# Patient Record
Sex: FEMALE | Race: WHITE | NOT HISPANIC OR LATINO | URBAN - METROPOLITAN AREA
[De-identification: names, ages, dates, MRNs, and addresses within clinical notes are randomized per-mention and may not be internally consistent; named-entity substitution may affect disease eponyms.]

---

## 2017-01-27 ENCOUNTER — IMPORTED ENCOUNTER (OUTPATIENT)
Dept: URBAN - METROPOLITAN AREA CLINIC 31 | Facility: CLINIC | Age: 62
End: 2017-01-27

## 2017-01-27 PROCEDURE — 92014 COMPRE OPH EXAM EST PT 1/>: CPT

## 2017-01-27 PROCEDURE — 92015 DETERMINE REFRACTIVE STATE: CPT

## 2017-01-27 NOTE — PATIENT DISCUSSION
1.  Refractive error- using OTc abd fine. Eyemed. 2.  RTN 1 yr [facility]OS[facility]havirony camarena issues. memory and on meds for BP. Eye makeup stylist.

## 2018-04-12 ENCOUNTER — IMPORTED ENCOUNTER (OUTPATIENT)
Dept: URBAN - METROPOLITAN AREA CLINIC 31 | Facility: CLINIC | Age: 63
End: 2018-04-12

## 2018-04-12 PROCEDURE — 92015 DETERMINE REFRACTIVE STATE: CPT

## 2018-04-12 PROCEDURE — 92014 COMPRE OPH EXAM EST PT 1/>: CPT

## 2018-04-12 NOTE — PATIENT DISCUSSION
1.  Refractive error- using OTc 2.25. Eyemed. 2.  RTN 1 yr [facility] issues. memory getting worse and on meds for BP. Eye makeup stylist.

## 2022-04-02 ASSESSMENT — TONOMETRY
OD_IOP_MMHG: 16
OS_IOP_MMHG: 16

## 2022-04-02 ASSESSMENT — VISUAL ACUITY
OD_CC: 20/30-2
OS_CC: 20/30-2
OS_CC: 20/30+1
OD_CC: 20/50-2